# Patient Record
Sex: FEMALE | Race: WHITE | NOT HISPANIC OR LATINO
[De-identification: names, ages, dates, MRNs, and addresses within clinical notes are randomized per-mention and may not be internally consistent; named-entity substitution may affect disease eponyms.]

---

## 2018-02-01 ENCOUNTER — RESULT REVIEW (OUTPATIENT)
Age: 28
End: 2018-02-01

## 2019-01-02 ENCOUNTER — RX RENEWAL (OUTPATIENT)
Age: 29
End: 2019-01-02

## 2019-01-03 ENCOUNTER — RX RENEWAL (OUTPATIENT)
Age: 29
End: 2019-01-03

## 2019-02-25 ENCOUNTER — RX RENEWAL (OUTPATIENT)
Age: 29
End: 2019-02-25

## 2019-03-04 ENCOUNTER — APPOINTMENT (OUTPATIENT)
Dept: OBGYN | Facility: CLINIC | Age: 29
End: 2019-03-04
Payer: MEDICAID

## 2019-03-04 VITALS
DIASTOLIC BLOOD PRESSURE: 70 MMHG | BODY MASS INDEX: 20.2 KG/M2 | WEIGHT: 107 LBS | SYSTOLIC BLOOD PRESSURE: 106 MMHG | HEIGHT: 61 IN

## 2019-03-04 LAB
BILIRUB UR QL STRIP: NORMAL
CLARITY UR: CLEAR
COLLECTION METHOD: NORMAL
GLUCOSE UR-MCNC: NORMAL
HCG UR QL: 0.2 EU/DL
HCG UR QL: NEGATIVE
HGB UR QL STRIP.AUTO: NORMAL
KETONES UR-MCNC: NORMAL
LEUKOCYTE ESTERASE UR QL STRIP: NORMAL
NITRITE UR QL STRIP: NORMAL
PH UR STRIP: 7
PROT UR STRIP-MCNC: NORMAL
QUALITY CONTROL: YES
SP GR UR STRIP: 1.01

## 2019-03-04 PROCEDURE — 81025 URINE PREGNANCY TEST: CPT

## 2019-03-04 PROCEDURE — 81003 URINALYSIS AUTO W/O SCOPE: CPT | Mod: QW

## 2019-03-04 PROCEDURE — 99213 OFFICE O/P EST LOW 20 MIN: CPT

## 2019-03-04 RX ORDER — NORETHINDRONE 0.35 MG/1
0.35 TABLET ORAL DAILY
Qty: 1 | Refills: 1 | Status: DISCONTINUED | COMMUNITY
Start: 2019-01-02 | End: 2019-03-04

## 2019-03-04 NOTE — COUNSELING
[Contraception] : contraception [Safe Sexual Practices] : safe sexual practices [Vulvar Hygiene] : vulvar hygiene

## 2019-03-04 NOTE — PHYSICAL EXAM
[Normal] : uterus [Discharge] : urethral discharge [Heavy] : heavy [Charity] : yellow [Curds] : curd-like [No Bleeding] : there was no active vaginal bleeding [Uterine Adnexae] : were not tender and not enlarged

## 2019-03-04 NOTE — HISTORY OF PRESENT ILLNESS
[Last Mammogram ___] : Last Mammogram was [unfilled] [Last Pap ___] : Last cervical pap smear was [unfilled] [Last Screen ___] : last STD screen was [unfilled] [Sexually Active] : is sexually active [Contraception] : uses contraception [Male ___] : [unfilled] male

## 2019-03-06 LAB
C TRACH RRNA SPEC QL NAA+PROBE: NOT DETECTED
CANDIDA VAG CYTO: DETECTED
G VAGINALIS+PREV SP MTYP VAG QL MICRO: DETECTED
N GONORRHOEA RRNA SPEC QL NAA+PROBE: NOT DETECTED
SOURCE AMPLIFICATION: NORMAL
T VAGINALIS VAG QL WET PREP: NOT DETECTED

## 2019-03-23 ENCOUNTER — TRANSCRIPTION ENCOUNTER (OUTPATIENT)
Age: 29
End: 2019-03-23

## 2019-04-30 ENCOUNTER — RECORD ABSTRACTING (OUTPATIENT)
Age: 29
End: 2019-04-30

## 2019-04-30 DIAGNOSIS — Z80.3 FAMILY HISTORY OF MALIGNANT NEOPLASM OF BREAST: ICD-10-CM

## 2019-04-30 DIAGNOSIS — Z87.440 PERSONAL HISTORY OF URINARY (TRACT) INFECTIONS: ICD-10-CM

## 2019-04-30 DIAGNOSIS — B37.3 CANDIDIASIS OF VULVA AND VAGINA: ICD-10-CM

## 2019-04-30 DIAGNOSIS — B00.9 HERPESVIRAL INFECTION, UNSPECIFIED: ICD-10-CM

## 2019-04-30 DIAGNOSIS — Z78.9 OTHER SPECIFIED HEALTH STATUS: ICD-10-CM

## 2019-04-30 DIAGNOSIS — Z82.62 FAMILY HISTORY OF OSTEOPOROSIS: ICD-10-CM

## 2019-04-30 DIAGNOSIS — Z87.42 PERSONAL HISTORY OF OTHER DISEASES OF THE FEMALE GENITAL TRACT: ICD-10-CM

## 2019-04-30 DIAGNOSIS — A74.9 CHLAMYDIAL INFECTION, UNSPECIFIED: ICD-10-CM

## 2019-04-30 DIAGNOSIS — Z92.89 PERSONAL HISTORY OF OTHER MEDICAL TREATMENT: ICD-10-CM

## 2019-04-30 DIAGNOSIS — Z86.59 PERSONAL HISTORY OF OTHER MENTAL AND BEHAVIORAL DISORDERS: ICD-10-CM

## 2019-04-30 LAB — CYTOLOGY CVX/VAG DOC THIN PREP: NORMAL

## 2019-05-01 ENCOUNTER — APPOINTMENT (OUTPATIENT)
Dept: OBGYN | Facility: CLINIC | Age: 29
End: 2019-05-01

## 2019-05-08 ENCOUNTER — APPOINTMENT (OUTPATIENT)
Dept: OBGYN | Facility: CLINIC | Age: 29
End: 2019-05-08
Payer: COMMERCIAL

## 2019-05-08 VITALS
HEIGHT: 61 IN | SYSTOLIC BLOOD PRESSURE: 110 MMHG | DIASTOLIC BLOOD PRESSURE: 70 MMHG | WEIGHT: 107 LBS | BODY MASS INDEX: 20.2 KG/M2

## 2019-05-08 DIAGNOSIS — Z86.59 PERSONAL HISTORY OF OTHER MENTAL AND BEHAVIORAL DISORDERS: ICD-10-CM

## 2019-05-08 DIAGNOSIS — Z86.69 PERSONAL HISTORY OF OTHER DISEASES OF THE NERVOUS SYSTEM AND SENSE ORGANS: ICD-10-CM

## 2019-05-08 DIAGNOSIS — R87.612 LOW GRADE SQUAMOUS INTRAEPITHELIAL LESION ON CYTOLOGIC SMEAR OF CERVIX (LGSIL): ICD-10-CM

## 2019-05-08 DIAGNOSIS — N76.0 ACUTE VAGINITIS: ICD-10-CM

## 2019-05-08 DIAGNOSIS — B96.89 ACUTE VAGINITIS: ICD-10-CM

## 2019-05-08 DIAGNOSIS — Z30.9 ENCOUNTER FOR CONTRACEPTIVE MANAGEMENT, UNSPECIFIED: ICD-10-CM

## 2019-05-08 DIAGNOSIS — Z86.19 PERSONAL HISTORY OF OTHER INFECTIOUS AND PARASITIC DISEASES: ICD-10-CM

## 2019-05-08 PROCEDURE — 36415 COLL VENOUS BLD VENIPUNCTURE: CPT

## 2019-05-08 PROCEDURE — 81003 URINALYSIS AUTO W/O SCOPE: CPT | Mod: QW

## 2019-05-08 PROCEDURE — 99395 PREV VISIT EST AGE 18-39: CPT

## 2019-05-08 PROCEDURE — 81025 URINE PREGNANCY TEST: CPT

## 2019-05-08 NOTE — PHYSICAL EXAM
[Awake] : awake [Alert] : alert [Soft] : soft [Oriented x3] : oriented to person, place, and time [Normal] : uterus [No Bleeding] : there was no active vaginal bleeding [Uterine Adnexae] : were not tender and not enlarged [Acute Distress] : no acute distress [Mass] : no breast mass [Axillary LAD] : no axillary lymphadenopathy [Nipple Discharge] : no nipple discharge [Tender] : non tender

## 2019-05-08 NOTE — COUNSELING
[Breast Self Exam] : breast self exam [Nutrition] : nutrition [Exercise] : exercise [Vitamins/Supplements] : vitamins/supplements [Sunscreen] : sunscreen [STD (testing, results, tx)] : STD (testing, results, tx) [Contraception] : contraception

## 2019-05-08 NOTE — PROCEDURE
[Cervical Pap Smear] : cervical Pap smear [Liquid Base] : liquid base [No Complications] : there were no complications [Tolerated Well] : the patient tolerated the procedure well [GC & Chlamydia via Pap] : GC & Chlamydia via Pap

## 2019-05-08 NOTE — HISTORY OF PRESENT ILLNESS
[Last Mammogram ___] : Last Mammogram was [unfilled] [Last Pap ___] : Last cervical pap smear was [unfilled] [Last Screen ___] : last STD screen was [unfilled] [Sexually Active] : is sexually active [Contraception] : uses contraception [Definite:  ___ (Date)] : the last menstrual period was [unfilled] [Menarche Age: ____] : age at menarche was [unfilled] [Oral Contraceptives] : uses oral contraceptives [Male ___] : [unfilled] male [Chlamydia] : Chlamydia - [Burning] : no burning [Gonorrhea] : Gonorrhea - [Mass] : no mass [Itching] : no itching [Stinging] : no stinging [Lesion] : no lesion [Soreness] : no soreness [Discharge] : no discharge [Monogamous] : is not monogamous

## 2019-05-18 LAB
BILIRUB UR QL STRIP: NORMAL
C TRACH RRNA SPEC QL NAA+PROBE: NOT DETECTED
CYTOLOGY CVX/VAG DOC THIN PREP: NORMAL
GLUCOSE UR-MCNC: NORMAL
HAV IGM SER QL: NONREACTIVE
HBV CORE IGM SER QL: NONREACTIVE
HBV SURFACE AG SER QL: NONREACTIVE
HCG UR QL: 0.2 EU/DL
HCG UR QL: NEGATIVE
HCV AB SER QL: NONREACTIVE
HCV S/CO RATIO: 0.14 S/CO
HGB UR QL STRIP.AUTO: NORMAL
HIV1+2 AB SPEC QL IA.RAPID: NONREACTIVE
HSV 1+2 IGG SER IA-IMP: NEGATIVE
HSV 1+2 IGG SER IA-IMP: POSITIVE
HSV1 IGG SER QL: 20.1 INDEX
HSV2 IGG SER QL: 0.2 INDEX
KETONES UR-MCNC: NORMAL
LEUKOCYTE ESTERASE UR QL STRIP: NORMAL
N GONORRHOEA RRNA SPEC QL NAA+PROBE: NOT DETECTED
NITRITE UR QL STRIP: NORMAL
PH UR STRIP: 7
PROT UR STRIP-MCNC: NORMAL
QUALITY CONTROL: YES
SOURCE TP AMPLIFICATION: NORMAL
SP GR UR STRIP: 1.01
T PALLIDUM AB SER QL IA: NEGATIVE

## 2019-07-01 ENCOUNTER — APPOINTMENT (OUTPATIENT)
Dept: OBGYN | Facility: CLINIC | Age: 29
End: 2019-07-01
Payer: COMMERCIAL

## 2019-07-01 VITALS
HEIGHT: 61 IN | WEIGHT: 114 LBS | DIASTOLIC BLOOD PRESSURE: 72 MMHG | SYSTOLIC BLOOD PRESSURE: 110 MMHG | BODY MASS INDEX: 21.52 KG/M2

## 2019-07-01 LAB
BILIRUB UR QL STRIP: NORMAL
GLUCOSE UR-MCNC: NORMAL
HCG UR QL: 0.2 EU/DL
HCG UR QL: NEGATIVE
HGB UR QL STRIP.AUTO: NORMAL
KETONES UR-MCNC: NORMAL
LEUKOCYTE ESTERASE UR QL STRIP: NORMAL
NITRITE UR QL STRIP: NORMAL
PH UR STRIP: 5.5
PROT UR STRIP-MCNC: NORMAL
QUALITY CONTROL: YES
SP GR UR STRIP: 1

## 2019-07-01 PROCEDURE — 81025 URINE PREGNANCY TEST: CPT

## 2019-07-01 PROCEDURE — 99213 OFFICE O/P EST LOW 20 MIN: CPT

## 2019-07-01 PROCEDURE — 81003 URINALYSIS AUTO W/O SCOPE: CPT | Mod: QW

## 2019-07-01 RX ORDER — NORETHINDRONE 0.35 MG/1
0.35 TABLET ORAL
Qty: 1 | Refills: 0 | Status: DISCONTINUED | COMMUNITY
Start: 2019-02-25 | End: 2019-07-01

## 2019-07-01 RX ORDER — METRONIDAZOLE 7.5 MG/G
0.75 GEL VAGINAL
Qty: 1 | Refills: 0 | Status: DISCONTINUED | COMMUNITY
Start: 2019-03-04 | End: 2019-07-01

## 2019-07-01 RX ORDER — FLUCONAZOLE 150 MG/1
150 TABLET ORAL
Qty: 4 | Refills: 1 | Status: DISCONTINUED | COMMUNITY
Start: 2019-03-04 | End: 2019-07-01

## 2019-07-01 RX ORDER — NORETHINDRONE 0.35 MG/1
0.35 TABLET ORAL
Refills: 0 | Status: DISCONTINUED | COMMUNITY
End: 2019-07-01

## 2019-07-02 NOTE — PHYSICAL EXAM
[Awake] : awake [Alert] : alert [Acute Distress] : no acute distress [Soft] : soft [Tender] : non tender [Distended] : not distended [Oriented x3] : oriented to person, place, and time [Depressed Mood] : not depressed [No Lesions] : no genitalia lesions [Labia Majora Erythema] : no erythema of the labia majora [Labia Minora Erythema] : no erythema of the labia minora [Normal] : uterus [Labia Majora] : labia major [Labia Minora] : labia minora [Pink Rugae] : pink rugae [Discharge] : a  ~M vaginal discharge was present [Moderate] : moderate [Foul Smelling] : not foul smelling [White] : white [Thin] : thin [No Bleeding] : there was no active vaginal bleeding [Dilated] : the cervix was not dilated [Motion Tenderness] : there was no cervical motion tenderness [Normal Position] : in a normal position [Tenderness] : nontender [Enlarged ___ wks] : not enlarged [Mass ___ cm] : no uterine mass was palpated [Uterine Adnexae] : were not tender and not enlarged [Adnexa Tenderness] : were not tender [Ovarian Mass (___ Cm)] : there were no adnexal masses

## 2019-07-02 NOTE — HISTORY OF PRESENT ILLNESS
[___ Month(s) Ago] : [unfilled] month(s) ago [Good] : being in good health [Healthy Diet] : a healthy diet [Regular Exercise] : regular exercise [Last Mammogram ___] : Last Mammogram was [unfilled] [Last Pap ___] : Last cervical pap smear was [unfilled] [Reproductive Age] : is of reproductive age [Oral Contraceptive] : uses oral contraception pills [Pregnancy History] : pregnancy history: [Total Preg ___] : [unfilled] [Full Term ___] : [unfilled] [Living ___] : [unfilled] [Definite:  ___ (Date)] : the last menstrual period was [unfilled] [Menarche Age: ____] : age at menarche was [unfilled] [Sexually Active] : is sexually active [Monogamous] : is monogamous [Contraception] : uses contraception [Oral Contraceptives] : uses oral contraceptives [Male ___] : [unfilled] male [No] : no [Weight Concerns] : no concerns with her weight [de-identified] : PELVIC US 3/11/2013

## 2019-07-09 ENCOUNTER — TRANSCRIPTION ENCOUNTER (OUTPATIENT)
Age: 29
End: 2019-07-09

## 2019-09-04 LAB
APPEARANCE: CLEAR
BACTERIA UR CULT: NORMAL
BACTERIA: NEGATIVE
BILIRUBIN URINE: NEGATIVE
BLOOD URINE: NEGATIVE
C TRACH RRNA SPEC QL NAA+PROBE: NOT DETECTED
CANDIDA VAG CYTO: NOT DETECTED
COLOR: COLORLESS
G VAGINALIS+PREV SP MTYP VAG QL MICRO: DETECTED
GLUCOSE QUALITATIVE U: NEGATIVE
HYALINE CASTS: 2 /LPF
KETONES URINE: NEGATIVE
LEUKOCYTE ESTERASE URINE: NEGATIVE
MICROSCOPIC-UA: NORMAL
N GONORRHOEA RRNA SPEC QL NAA+PROBE: NOT DETECTED
NITRITE URINE: NEGATIVE
PH URINE: 6.5
PROTEIN URINE: NEGATIVE
RED BLOOD CELLS URINE: 1 /HPF
SOURCE AMPLIFICATION: NORMAL
SPECIFIC GRAVITY URINE: 1.01
SQUAMOUS EPITHELIAL CELLS: 3 /HPF
T VAGINALIS VAG QL WET PREP: NOT DETECTED
UROBILINOGEN URINE: NORMAL
WHITE BLOOD CELLS URINE: 1 /HPF

## 2019-10-17 ENCOUNTER — APPOINTMENT (OUTPATIENT)
Dept: OBGYN | Facility: CLINIC | Age: 29
End: 2019-10-17

## 2020-01-28 ENCOUNTER — APPOINTMENT (OUTPATIENT)
Dept: FAMILY MEDICINE | Facility: CLINIC | Age: 30
End: 2020-01-28
Payer: COMMERCIAL

## 2020-01-28 VITALS
SYSTOLIC BLOOD PRESSURE: 80 MMHG | HEART RATE: 75 BPM | TEMPERATURE: 98.1 F | DIASTOLIC BLOOD PRESSURE: 58 MMHG | BODY MASS INDEX: 20.01 KG/M2 | OXYGEN SATURATION: 98 % | RESPIRATION RATE: 16 BRPM | HEIGHT: 61 IN | WEIGHT: 106 LBS

## 2020-01-28 DIAGNOSIS — G43.909 MIGRAINE, UNSPECIFIED, NOT INTRACTABLE, W/OUT STATUS MIGRAINOSUS: ICD-10-CM

## 2020-01-28 DIAGNOSIS — Z13.220 ENCOUNTER FOR SCREENING FOR LIPOID DISORDERS: ICD-10-CM

## 2020-01-28 DIAGNOSIS — Z13.39 ENCOUNTER FOR SCREENING EXAM FOR OTHER MENTAL HEALTH AND BEHAVIORAL DISORDERS: ICD-10-CM

## 2020-01-28 DIAGNOSIS — Z00.00 ENCOUNTER FOR GENERAL ADULT MEDICAL EXAMINATION W/OUT ABNORMAL FINDINGS: ICD-10-CM

## 2020-01-28 DIAGNOSIS — Z13.31 ENCOUNTER FOR SCREENING FOR DEPRESSION: ICD-10-CM

## 2020-01-28 DIAGNOSIS — Z86.2 PERSONAL HISTORY OF DISEASES OF THE BLOOD AND BLOOD-FORMING ORGANS AND CERTAIN DISORDERS INVOLVING THE IMMUNE MECHANISM: ICD-10-CM

## 2020-01-28 DIAGNOSIS — Z13.29 ENCOUNTER FOR SCREENING FOR OTHER SUSPECTED ENDOCRINE DISORDER: ICD-10-CM

## 2020-01-28 PROCEDURE — 99385 PREV VISIT NEW AGE 18-39: CPT | Mod: 25

## 2020-01-28 PROCEDURE — G0442 ANNUAL ALCOHOL SCREEN 15 MIN: CPT

## 2020-01-28 PROCEDURE — 36415 COLL VENOUS BLD VENIPUNCTURE: CPT

## 2020-01-28 RX ORDER — SUMATRIPTAN 100 MG/1
100 TABLET, FILM COATED ORAL
Refills: 0 | Status: DISCONTINUED | COMMUNITY
End: 2020-01-28

## 2020-01-28 RX ORDER — METRONIDAZOLE 7.5 MG/G
0.75 GEL VAGINAL
Qty: 1 | Refills: 0 | Status: DISCONTINUED | COMMUNITY
Start: 2019-07-01 | End: 2020-01-28

## 2020-01-28 RX ORDER — FLUCONAZOLE 150 MG/1
150 TABLET ORAL
Qty: 1 | Refills: 0 | Status: DISCONTINUED | COMMUNITY
Start: 2019-07-15 | End: 2020-01-28

## 2020-01-28 RX ORDER — NORETHINDRONE 0.35 MG/1
0.35 TABLET ORAL DAILY
Qty: 84 | Refills: 3 | Status: DISCONTINUED | COMMUNITY
Start: 2019-05-08 | End: 2020-01-28

## 2020-01-28 RX ORDER — GABAPENTIN 400 MG/1
400 CAPSULE ORAL
Refills: 0 | Status: DISCONTINUED | COMMUNITY
End: 2020-01-28

## 2020-01-28 NOTE — PHYSICAL EXAM
[No Acute Distress] : no acute distress [Well Nourished] : well nourished [Well Developed] : well developed [Normal Sclera/Conjunctiva] : normal sclera/conjunctiva [Well-Appearing] : well-appearing [PERRL] : pupils equal round and reactive to light [EOMI] : extraocular movements intact [Normal Outer Ear/Nose] : the outer ears and nose were normal in appearance [Normal Oropharynx] : the oropharynx was normal [No JVD] : no jugular venous distention [No Lymphadenopathy] : no lymphadenopathy [Supple] : supple [No Respiratory Distress] : no respiratory distress  [Thyroid Normal, No Nodules] : the thyroid was normal and there were no nodules present [No Accessory Muscle Use] : no accessory muscle use [Clear to Auscultation] : lungs were clear to auscultation bilaterally [Regular Rhythm] : with a regular rhythm [Normal Rate] : normal rate  [Normal S1, S2] : normal S1 and S2 [No Murmur] : no murmur heard [No Edema] : there was no peripheral edema [Pedal Pulses Present] : the pedal pulses are present [No Extremity Clubbing/Cyanosis] : no extremity clubbing/cyanosis [Soft] : abdomen soft [Non Tender] : non-tender [No Masses] : no abdominal mass palpated [Non-distended] : non-distended [No HSM] : no HSM [Normal Bowel Sounds] : normal bowel sounds [Normal Anterior Cervical Nodes] : no anterior cervical lymphadenopathy [Normal Posterior Cervical Nodes] : no posterior cervical lymphadenopathy [No CVA Tenderness] : no CVA  tenderness [No Spinal Tenderness] : no spinal tenderness [No Joint Swelling] : no joint swelling [Grossly Normal Strength/Tone] : grossly normal strength/tone [Coordination Grossly Intact] : coordination grossly intact [No Rash] : no rash [No Focal Deficits] : no focal deficits [Normal Gait] : normal gait [Deep Tendon Reflexes (DTR)] : deep tendon reflexes were 2+ and symmetric [Normal Affect] : the affect was normal [Normal Insight/Judgement] : insight and judgment were intact

## 2020-01-28 NOTE — ASSESSMENT
[FreeTextEntry1] : Annual\par Check labs\par Follow up with GYN regarding irregular cycles and OCP`s.\par Refuses flu vaccine.

## 2020-01-28 NOTE — HEALTH RISK ASSESSMENT
[Excellent] : ~his/her~  mood as  excellent [Yes] : Yes [2 - 4 times a month (2 pts)] : 2-4 times a month (2 points) [1 or 2 (0 pts)] : 1 or 2 (0 points) [Never (0 pts)] : Never (0 points) [No falls in past year] : Patient reported no falls in the past year [0] : 2) Feeling down, depressed, or hopeless: Not at all (0) [Patient reported PAP Smear was normal] : Patient reported PAP Smear was normal [None] : None [With Family] : lives with family [Employed] : employed [Student] : student [Sexually Active] : sexually active [Single] : single [Smoke Detector] : smoke detector [Carbon Monoxide Detector] : carbon monoxide detector [Seat Belt] :  uses seat belt [Sunscreen] : uses sunscreen [Patient/Caregiver unclear of wishes] : Patient/Caregiver unclear of wishes [] : No [[id number]] : 0 [Audit-CScore] : 2 [Change in mental status noted] : No change in mental status noted [High Risk Behavior] : no high risk behavior [Reports changes in hearing] : Reports no changes in hearing [Reports changes in vision] : Reports no changes in vision [Reports changes in dental health] : Reports no changes in dental health [TB Exposure] : is not being exposed to tuberculosis [PapSmearDate] : 5/2019 [HIVDate] : 5/2019 [HepatitisCDate] : 5/2019 [FreeTextEntry2] : 2 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 [AdvancecareDate] : 1/2020

## 2020-01-28 NOTE — HISTORY OF PRESENT ILLNESS
[FreeTextEntry1] : annual/ Establish care [de-identified] : Ms. ONEYDA CORDOVA is a 29 year old female presenting for an annual, establish care\par since she changed her birth control pills, migraines have reduced but she has noted that her cycles are irregular.\par

## 2020-02-03 ENCOUNTER — TRANSCRIPTION ENCOUNTER (OUTPATIENT)
Age: 30
End: 2020-02-03

## 2020-02-03 LAB
25(OH)D3 SERPL-MCNC: 37.8 NG/ML
ALBUMIN SERPL ELPH-MCNC: 4.7 G/DL
ALP BLD-CCNC: 39 U/L
ALT SERPL-CCNC: 14 U/L
ANION GAP SERPL CALC-SCNC: 11 MMOL/L
APPEARANCE: CLEAR
AST SERPL-CCNC: 14 U/L
BASOPHILS # BLD AUTO: 0.03 K/UL
BASOPHILS NFR BLD AUTO: 0.3 %
BILIRUB SERPL-MCNC: 0.5 MG/DL
BILIRUBIN URINE: NEGATIVE
BLOOD URINE: NEGATIVE
BUN SERPL-MCNC: 12 MG/DL
CALCIUM SERPL-MCNC: 9.2 MG/DL
CHLORIDE SERPL-SCNC: 104 MMOL/L
CHOLEST SERPL-MCNC: 161 MG/DL
CHOLEST/HDLC SERPL: 2 RATIO
CO2 SERPL-SCNC: 24 MMOL/L
COLOR: COLORLESS
CREAT SERPL-MCNC: 0.72 MG/DL
EOSINOPHIL # BLD AUTO: 0.03 K/UL
EOSINOPHIL NFR BLD AUTO: 0.3 %
FERRITIN SERPL-MCNC: 54 NG/ML
GLUCOSE QUALITATIVE U: NEGATIVE
GLUCOSE SERPL-MCNC: 80 MG/DL
HCT VFR BLD CALC: 43 %
HDLC SERPL-MCNC: 81 MG/DL
HGB BLD-MCNC: 13.7 G/DL
IMM GRANULOCYTES NFR BLD AUTO: 0.3 %
IRON SATN MFR SERPL: 39 %
IRON SERPL-MCNC: 104 UG/DL
KETONES URINE: NEGATIVE
LDLC SERPL CALC-MCNC: 73 MG/DL
LEUKOCYTE ESTERASE URINE: NEGATIVE
LYMPHOCYTES # BLD AUTO: 2.22 K/UL
LYMPHOCYTES NFR BLD AUTO: 21.6 %
MAN DIFF?: NORMAL
MCHC RBC-ENTMCNC: 30.5 PG
MCHC RBC-ENTMCNC: 31.9 GM/DL
MCV RBC AUTO: 95.8 FL
MONOCYTES # BLD AUTO: 0.47 K/UL
MONOCYTES NFR BLD AUTO: 4.6 %
NEUTROPHILS # BLD AUTO: 7.49 K/UL
NEUTROPHILS NFR BLD AUTO: 72.9 %
NITRITE URINE: NEGATIVE
PH URINE: 6.5
PLATELET # BLD AUTO: 199 K/UL
POTASSIUM SERPL-SCNC: 4.4 MMOL/L
PROT SERPL-MCNC: 6.9 G/DL
PROTEIN URINE: NEGATIVE
RBC # BLD: 4.49 M/UL
RBC # FLD: 12.6 %
SODIUM SERPL-SCNC: 139 MMOL/L
SPECIFIC GRAVITY URINE: 1
T4 FREE SERPL-MCNC: 1.2 NG/DL
TIBC SERPL-MCNC: 268 UG/DL
TRIGL SERPL-MCNC: 34 MG/DL
TSH SERPL-ACNC: 1.21 UIU/ML
UIBC SERPL-MCNC: 164 UG/DL
URATE SERPL-MCNC: 3.5 MG/DL
UROBILINOGEN URINE: NORMAL
VIT B12 SERPL-MCNC: 983 PG/ML
WBC # FLD AUTO: 10.27 K/UL

## 2020-03-12 ENCOUNTER — APPOINTMENT (OUTPATIENT)
Dept: OBGYN | Facility: CLINIC | Age: 30
End: 2020-03-12
Payer: COMMERCIAL

## 2020-03-12 VITALS
HEIGHT: 61 IN | BODY MASS INDEX: 20.77 KG/M2 | SYSTOLIC BLOOD PRESSURE: 100 MMHG | WEIGHT: 110 LBS | DIASTOLIC BLOOD PRESSURE: 60 MMHG

## 2020-03-12 PROCEDURE — 99214 OFFICE O/P EST MOD 30 MIN: CPT

## 2020-03-12 NOTE — DISCUSSION/SUMMARY
[FreeTextEntry1] : 29 year old  1 para 1 is here for contraceptive surveillance and discussion about recurrent bacterial vaginosis.\par \par ONEYDA is on a progesterone -only pill secondary to migraines.  She is satisfied but does admit that her bleeding is erratic with her sometimes "skipping a whole month."  She admits to good compliance and we talked about the mechanism of her pill as well as instructions, precautions, expectations.  She requests a renewal and a prescription was electronically transmitted to her pharmacy.\par \par The patient is frustrated by the frequency of her infections and the foul odor she notes after intercourse.  She has been prescribed MetroGel in the past and has self-treated with "some extra left in the tube."  ONEYDA does not want to have to take medicine "all the time...Its ruining my sex life."  We spoke about the issues including the importance of vulvovaginal hygiene for maintenance of pH and the ways via diet, supplements to accomplish this.  She will try the 600 mg Sero Lisa boric acid suppositories and will return for follow up at the time of her upcoming annual exam.\par \par All of her concerns were addressed, questions answered and reassurance was given.

## 2020-03-12 NOTE — COUNSELING
[Nutrition] : nutrition [Vitamins/Supplements] : vitamins/supplements [Contraception] : contraception [Sunscreen] : sunscreen [Vulvar Hygiene] : vulvar hygiene

## 2020-03-12 NOTE — HISTORY OF PRESENT ILLNESS
[Last Mammogram ___] : Last Mammogram was [unfilled] [Last Pap ___] : Last cervical pap smear was [unfilled] [Last Screen ___] : last STD screen was [unfilled] [Definite:  ___ (Date)] : the last menstrual period was [unfilled] [Menarche Age: ____] : age at menarche was [unfilled] [Sexually Active] : is sexually active [Contraception] : uses contraception [Oral Contraceptives] : uses oral contraceptives [Male ___] : [unfilled] male [Chlamydia] : Chlamydia - [Gonorrhea] : Gonorrhea - [Burning] : no burning [Itching] : no itching [Mass] : no mass [Stinging] : no stinging [Lesion] : no lesion [Soreness] : no soreness [Discharge] : no discharge [Monogamous] : is not monogamous

## 2020-05-25 ENCOUNTER — TRANSCRIPTION ENCOUNTER (OUTPATIENT)
Age: 30
End: 2020-05-25

## 2020-06-05 ENCOUNTER — APPOINTMENT (OUTPATIENT)
Dept: OBGYN | Facility: CLINIC | Age: 30
End: 2020-06-05
Payer: MEDICAID

## 2020-06-05 VITALS
BODY MASS INDEX: 21.71 KG/M2 | HEIGHT: 61 IN | SYSTOLIC BLOOD PRESSURE: 98 MMHG | DIASTOLIC BLOOD PRESSURE: 60 MMHG | TEMPERATURE: 98.3 F | WEIGHT: 115 LBS

## 2020-06-05 DIAGNOSIS — Z01.419 ENCOUNTER FOR GYNECOLOGICAL EXAMINATION (GENERAL) (ROUTINE) W/OUT ABNORMAL FINDINGS: ICD-10-CM

## 2020-06-05 DIAGNOSIS — Z87.42 PERSONAL HISTORY OF OTHER DISEASES OF THE FEMALE GENITAL TRACT: ICD-10-CM

## 2020-06-05 PROCEDURE — 99395 PREV VISIT EST AGE 18-39: CPT

## 2020-06-05 NOTE — HISTORY OF PRESENT ILLNESS
[Last Screen ___] : last STD screen was [unfilled] [Definite:  ___ (Date)] : the last menstrual period was [unfilled] [Menarche Age: ____] : age at menarche was [unfilled] [Sexually Active] : is sexually active [Contraception] : uses contraception [Oral Contraceptives] : uses oral contraceptives [Male ___] : [unfilled] male [1 Year Ago] : 1 year ago [Good] : being in good health [Last Pap Smear ___] : last Papanicolaou cytology done [unfilled] [Last Mammogram ___] : last mammogram done [unfilled] [No Previous Colonoscopy] : no previous colonoscopy [Regular Exercise] : regular exercise [Healthy Diet] : a healthy diet [Night Sweats] : night sweats [Chlamydia] : Chlamydia - [Gonorrhea] : Gonorrhea - [Burning] : no burning [Itching] : no itching [Mass] : no mass [Lesion] : no lesion [Stinging] : no stinging [Soreness] : no soreness [Localized Pain] : no localized pain [Discharge] : no discharge [Mass (___cm)] : no palpable mass [Diffused Pain] : no diffused pain [Skin Color Change] : no skin color change [Nipple Discharge] : no nipple discharge [Hot Flashes] : no hot flashes [Vaginal Itching] : no vaginal itching [Dyspareunia] : no dyspareunia [Mood Changes] : no mood changes [Monogamous] : is not monogamous

## 2020-06-05 NOTE — DISCUSSION/SUMMARY
[Progesterone (only) Pill] : progesterone (only) pill [Cycle Control] : cycle control [Pregnancy Prevention] : pregnancy prevention [FreeTextEntry1] : \par 30 year old  1 para 1, whose last menstrual period was 5/15/2020, is here for an annual exam and contraceptive surveillance.  Her Pap smear from 2019 was negative. \par \par ONEYDA was seen on 3/12/2020 for contraceptive surveillance and discussion about recurrent bacterial vaginosis.\par \par She is on a progesterone-only pill secondary to migraines and is satisfied to-date.  The instructions, precautions, expectations were reviewed and a prescription was electronically transmitted to her pharmacy.  She requests a renewal and a prescription was electronically transmitted to her pharmacy.  Secondary to recent issues with e-scribing, the prescription was also called into her pharmacy.\par \par The patient is frustrated by the frequency of her infections and the foul odor she notes after intercourse.  She has not been sexually active in light of the pandemic so has no complaints.  She has been prescribed MetroGel in the past and has self-treated with "some extra left in the tube."  ONEYDA is worried about the effects of this on her sex life.  On speculum examination, there is scant, foul-smelling, thick discharge in the vault.  A culture was taken and she accepted empiric treatment with MetroGel.  We spoke about the issues including the importance of vulvovaginal hygiene for maintenance of pH and the ways via diet, supplements to accomplish this.  She purchased 600 mg SeroFlora boric acid suppositories on [employer] and has used them occasionally.\par \par On inspection of the breasts, there were no skin color changes, dimpling or retraction seen.  No masses or axillary lymphadenopathy were appreciated on palpation.  She was advised of the current recommendations to reduce breast cancer including but not limited to engaging in regular exercise, consuming a low fat diet, limiting alcohol intake, maintaining ideal body weight and performing monthly self breast examination. \par \par All of her concerns were addressed, questions answered and reassurance was given.

## 2020-06-05 NOTE — COUNSELING
[Nutrition] : nutrition [Vitamins/Supplements] : vitamins/supplements [Contraception] : contraception [Sunscreen] : sunscreen [Vulvar Hygiene] : vulvar hygiene [Breast Self Exam] : breast self exam [Exercise] : exercise

## 2020-06-06 LAB
CANDIDA VAG CYTO: NOT DETECTED
G VAGINALIS+PREV SP MTYP VAG QL MICRO: DETECTED
HPV HIGH+LOW RISK DNA PNL CVX: NOT DETECTED
T VAGINALIS VAG QL WET PREP: NOT DETECTED

## 2020-07-16 ENCOUNTER — TRANSCRIPTION ENCOUNTER (OUTPATIENT)
Age: 30
End: 2020-07-16

## 2020-09-04 ENCOUNTER — RX RENEWAL (OUTPATIENT)
Age: 30
End: 2020-09-04

## 2020-12-23 PROBLEM — Z01.419 ENCOUNTER FOR ANNUAL ROUTINE GYNECOLOGICAL EXAMINATION: Status: RESOLVED | Noted: 2020-03-12 | Resolved: 2020-12-23

## 2021-02-17 ENCOUNTER — APPOINTMENT (OUTPATIENT)
Dept: OBGYN | Facility: CLINIC | Age: 31
End: 2021-02-17
Payer: MEDICAID

## 2021-02-17 VITALS
WEIGHT: 113 LBS | TEMPERATURE: 97.3 F | HEIGHT: 61 IN | BODY MASS INDEX: 21.34 KG/M2 | SYSTOLIC BLOOD PRESSURE: 100 MMHG | DIASTOLIC BLOOD PRESSURE: 62 MMHG

## 2021-02-17 PROCEDURE — XXXXX: CPT

## 2021-02-17 NOTE — END OF VISIT
[FreeTextEntry3] : I, Evette Whitehead, solely acted as scribe for Dr. Esperanza Knight on 02/17/2021.\par All medical entries made by the Scribe were at my, Dr. Knight's, direction and personally dictated by me on 02/17/2021. I have reviewed the chart and agree that the record accurately reflects my personal performance of the history, physical exam, assessment and plan. I have also personally directed, reviewed, and agreed with the chart.

## 2021-02-17 NOTE — HISTORY OF PRESENT ILLNESS
[Patient reported PAP Smear was normal] : Patient reported PAP Smear was normal [N] : Patient reports normal menses [Oral Contraceptive] : uses oral contraception pills [Y] : Positive pregnancy history [Menarche Age: ____] : age at menarche was [unfilled] [No] : Patient does not have concerns regarding sex [Currently Active] : currently active [Men] : men [Yes] : Yes [TextBox_4] : Pt presents with complaints of pain with intercourse [PapSmeardate] : 06/05/2020 [HPVDate] : 06/05/2020 [LMPDate] : 02/01/2021 [PGxTotal] : 1 [[facility]] : [facility] [[facility]] : 1 [FreeTextEntry1] : 02/01/2021 [FreeTextEntry3] : OCP

## 2021-02-17 NOTE — PHYSICAL EXAM
[Appropriately responsive] : appropriately responsive [Alert] : alert [No Acute Distress] : no acute distress [Soft] : soft [Non-tender] : non-tender [Non-distended] : non-distended [No HSM] : No HSM [No Lesions] : no lesions [No Mass] : no mass [Oriented x3] : oriented x3 [Labia Majora] : normal [Labia Minora] : normal [Discharge] : a  ~M vaginal discharge was present [White] : white [Thin] : thin [No Bleeding] : There was no active vaginal bleeding [Normal] : normal [Uterine Adnexae] : normal

## 2021-02-17 NOTE — DISCUSSION/SUMMARY
[FreeTextEntry1] : -Dyspareunia - Thin white vaginal discharge noted. No masses palpated. Affirm culture collected. Rx for pelvic sonogram ordered. Urine sample was collected to r/o UTI.\par \par -Follow up in 1 week for pelvic sonogram and visit. All questions and concerns were addressed.

## 2021-02-24 ENCOUNTER — ASOB RESULT (OUTPATIENT)
Age: 31
End: 2021-02-24

## 2021-02-24 ENCOUNTER — APPOINTMENT (OUTPATIENT)
Dept: OBGYN | Facility: CLINIC | Age: 31
End: 2021-02-24
Payer: MEDICAID

## 2021-02-24 VITALS
HEIGHT: 61 IN | WEIGHT: 113 LBS | DIASTOLIC BLOOD PRESSURE: 62 MMHG | SYSTOLIC BLOOD PRESSURE: 98 MMHG | TEMPERATURE: 98.1 F | BODY MASS INDEX: 21.34 KG/M2

## 2021-02-24 PROCEDURE — 99213 OFFICE O/P EST LOW 20 MIN: CPT | Mod: 25

## 2021-02-24 PROCEDURE — 76830 TRANSVAGINAL US NON-OB: CPT

## 2021-02-24 PROCEDURE — 99072 ADDL SUPL MATRL&STAF TM PHE: CPT

## 2021-02-24 NOTE — DISCUSSION/SUMMARY
[FreeTextEntry1] : \par Discussed her previous lab results that were drawn on 2/17/21 are all WNL including negative pregnancy test.\par \par Discussed TV sono: anteverted uterus, 2.3mm endometrium, mutiple nabothian cysts seen in cervix, right ovary has a complex cyst 2.3cm, left ovary appears WNL, minimal free fluid seen in cul de sac.\par \par Differentials for pelvic pain reviewed including possible cyst rupture and r/o ovarian torsion . Call parameters for severe pain reviewed to call office for evaluation or go to ED if office is closed if pain occurs and persists again. She is to return in 2 months for repeat pelvic ultrasound with followup results visit afterwards. Patient verbalizes understanding.\par \par \par

## 2021-02-24 NOTE — HISTORY OF PRESENT ILLNESS
[Patient reported PAP Smear was normal] : Patient reported PAP Smear was normal [Oral Contraceptive] : uses oral contraception pills [Y] : Positive pregnancy history [Menarche Age: ____] : age at menarche was [unfilled] [PapSmeardate] : 6/5/2020 [HPVDate] : 6/2/2020 [TextBox_78] : NEG [LMPDate] : 2/1/2020 [PGxTotal] : 1 [[facility]xFul[facility]] : 1 [[facility]] : 1 [FreeTextEntry1] : 2/1/2020

## 2021-03-03 LAB
APPEARANCE: CLEAR
BACTERIA UR CULT: NORMAL
BACTERIA: NEGATIVE
BILIRUBIN URINE: NEGATIVE
BLOOD URINE: NEGATIVE
C TRACH RRNA SPEC QL NAA+PROBE: NOT DETECTED
CANDIDA VAG CYTO: NOT DETECTED
COLOR: NORMAL
G VAGINALIS+PREV SP MTYP VAG QL MICRO: NOT DETECTED
GLUCOSE QUALITATIVE U: NEGATIVE
HCG UR QL: NEGATIVE
HYALINE CASTS: 0 /LPF
KETONES URINE: NEGATIVE
LEUKOCYTE ESTERASE URINE: NEGATIVE
MICROSCOPIC-UA: NORMAL
N GONORRHOEA RRNA SPEC QL NAA+PROBE: NOT DETECTED
NITRITE URINE: NEGATIVE
PH URINE: 6
PROTEIN URINE: NEGATIVE
QUALITY CONTROL: YES
RED BLOOD CELLS URINE: 0 /HPF
SOURCE AMPLIFICATION: NORMAL
SPECIFIC GRAVITY URINE: 1.02
SQUAMOUS EPITHELIAL CELLS: 1 /HPF
T VAGINALIS VAG QL WET PREP: NOT DETECTED
UROBILINOGEN URINE: NORMAL
WHITE BLOOD CELLS URINE: 0 /HPF

## 2021-04-14 ENCOUNTER — APPOINTMENT (OUTPATIENT)
Dept: OBGYN | Facility: CLINIC | Age: 31
End: 2021-04-14

## 2021-05-12 ENCOUNTER — ASOB RESULT (OUTPATIENT)
Age: 31
End: 2021-05-12

## 2021-05-12 ENCOUNTER — APPOINTMENT (OUTPATIENT)
Dept: OBGYN | Facility: CLINIC | Age: 31
End: 2021-05-12
Payer: MEDICAID

## 2021-05-12 VITALS
BODY MASS INDEX: 21.71 KG/M2 | HEIGHT: 61 IN | DIASTOLIC BLOOD PRESSURE: 70 MMHG | SYSTOLIC BLOOD PRESSURE: 110 MMHG | TEMPERATURE: 97.2 F | WEIGHT: 115 LBS

## 2021-05-12 DIAGNOSIS — Z30.41 ENCOUNTER FOR SURVEILLANCE OF CONTRACEPTIVE PILLS: ICD-10-CM

## 2021-05-12 DIAGNOSIS — R10.2 PELVIC AND PERINEAL PAIN: ICD-10-CM

## 2021-05-12 DIAGNOSIS — Z87.898 PERSONAL HISTORY OF OTHER SPECIFIED CONDITIONS: ICD-10-CM

## 2021-05-12 DIAGNOSIS — Z12.39 ENCOUNTER FOR OTHER SCREENING FOR MALIGNANT NEOPLASM OF BREAST: ICD-10-CM

## 2021-05-12 DIAGNOSIS — Z30.09 ENCOUNTER FOR OTHER GENERAL COUNSELING AND ADVICE ON CONTRACEPTION: ICD-10-CM

## 2021-05-12 DIAGNOSIS — Z11.3 ENCOUNTER FOR SCREENING FOR INFECTIONS WITH A PREDOMINANTLY SEXUAL MODE OF TRANSMISSION: ICD-10-CM

## 2021-05-12 DIAGNOSIS — Z87.42 PERSONAL HISTORY OF OTHER DISEASES OF THE FEMALE GENITAL TRACT: ICD-10-CM

## 2021-05-12 DIAGNOSIS — N76.0 ACUTE VAGINITIS: ICD-10-CM

## 2021-05-12 PROCEDURE — 99212 OFFICE O/P EST SF 10 MIN: CPT | Mod: 25

## 2021-05-12 PROCEDURE — 99072 ADDL SUPL MATRL&STAF TM PHE: CPT

## 2021-05-12 PROCEDURE — 76830 TRANSVAGINAL US NON-OB: CPT

## 2021-05-12 RX ORDER — METRONIDAZOLE 7.5 MG/G
0.75 GEL VAGINAL
Qty: 1 | Refills: 0 | Status: DISCONTINUED | COMMUNITY
Start: 2020-06-05 | End: 2021-05-12

## 2021-05-12 NOTE — HISTORY OF PRESENT ILLNESS
[Patient reported PAP Smear was normal] : Patient reported PAP Smear was normal [Gonorrhea test offered] : Gonorrhea test offered [Chlamydia test offered] : Chlamydia test offered [HPV test offered] : HPV test offered [N] : Patient reports normal menses [Oral Contraceptive] : uses oral contraception pills [Monogamous (Male Partner)] : is monogamous with a male partner [Y] : Positive pregnancy history [Menarche Age: ____] : age at menarche was [unfilled] [Yes] : Patient has concerns regarding sex [Currently Active] : currently active [Men] : men [Vaginal] : vaginal [No] : No [Patient refuses STI testing] : Patient refuses STI testing [TextBox_4] : PATIENT PRESENTS FOR SONO AND RESULTS. [Papeardate] : 06/05/20 [TextBox_31] : NEG [GonorrheaDate] : 02/17/21 [TextBox_63] : NEG [ChlamydiaDate] : 02/17/21 [TextBox_68] : NEG [HPVDate] : 06/05/20 [LMPDate] : 05/10/21 [PGxTotal] : 1 [[facility]] : 1 [[facility]] : 1 [FreeTextEntry1] : PAIN

## 2021-05-12 NOTE — DISCUSSION/SUMMARY
[FreeTextEntry1] : Differential dx reviewed.  PT stating she will schedule a consult with GI. All the pt's questions and concerns were addressed.

## 2021-05-12 NOTE — COUNSELING
[Bladder Hygiene] : bladder hygiene [Contraception/ Emergency Contraception/ Safe Sexual Practices] : contraception, emergency contraception, safe sexual practices

## 2021-05-12 NOTE — REVIEW OF SYSTEMS
[Patient Intake Form Reviewed] : Patient intake form was reviewed [Abdominal Pain] : abdominal pain [Negative] : Heme/Lymph

## 2021-05-12 NOTE — PHYSICAL EXAM
[Appropriately responsive] : appropriately responsive [Alert] : alert [No Acute Distress] : no acute distress [Oriented x3] : oriented x3 [Labia Majora] : normal [Labia Minora] : normal [No Bleeding] : There was no active vaginal bleeding [Normal] : normal [Normal Position] : in a normal position [Uterine Adnexae] : normal

## 2021-05-17 ENCOUNTER — NON-APPOINTMENT (OUTPATIENT)
Age: 31
End: 2021-05-17

## 2021-06-09 ENCOUNTER — NON-APPOINTMENT (OUTPATIENT)
Age: 31
End: 2021-06-09

## 2021-07-08 ENCOUNTER — APPOINTMENT (OUTPATIENT)
Dept: OBGYN | Facility: CLINIC | Age: 31
End: 2021-07-08

## 2021-07-08 NOTE — HISTORY OF PRESENT ILLNESS
[Patient reported PAP Smear was normal] : Patient reported PAP Smear was normal [Y] : Positive pregnancy history [PapSmeardate] : 06/05/2020 [TextBox_31] : NEG [PGxTotal] : 1 [[facility]xFul[facility]] : 1 [[facility]iving] : 1 [Yes] : pregnancy [TextBox_9] : 13 [Currently Active] : currently active [Men] : men

## 2021-07-30 ENCOUNTER — APPOINTMENT (OUTPATIENT)
Dept: OBGYN | Facility: CLINIC | Age: 31
End: 2021-07-30

## 2021-08-12 ENCOUNTER — APPOINTMENT (OUTPATIENT)
Dept: OBGYN | Facility: CLINIC | Age: 31
End: 2021-08-12
Payer: MEDICAID

## 2021-08-12 ENCOUNTER — RESULT CHARGE (OUTPATIENT)
Age: 31
End: 2021-08-12

## 2021-08-12 VITALS
WEIGHT: 117 LBS | DIASTOLIC BLOOD PRESSURE: 64 MMHG | BODY MASS INDEX: 22.09 KG/M2 | SYSTOLIC BLOOD PRESSURE: 110 MMHG | HEIGHT: 61 IN

## 2021-08-12 DIAGNOSIS — R35.0 FREQUENCY OF MICTURITION: ICD-10-CM

## 2021-08-12 DIAGNOSIS — N89.8 OTHER SPECIFIED NONINFLAMMATORY DISORDERS OF VAGINA: ICD-10-CM

## 2021-08-12 LAB
BILIRUB UR QL STRIP: NORMAL
GLUCOSE UR-MCNC: NORMAL
HCG UR QL: 0.2 EU/DL
HCG UR QL: NEGATIVE
HGB UR QL STRIP.AUTO: ABNORMAL
KETONES UR-MCNC: NORMAL
LEUKOCYTE ESTERASE UR QL STRIP: NORMAL
NITRITE UR QL STRIP: NORMAL
PH UR STRIP: 5.5
PROT UR STRIP-MCNC: NORMAL
QUALITY CONTROL: YES
SP GR UR STRIP: 1

## 2021-08-12 PROCEDURE — 99214 OFFICE O/P EST MOD 30 MIN: CPT

## 2021-08-12 PROCEDURE — 81003 URINALYSIS AUTO W/O SCOPE: CPT | Mod: QW

## 2021-08-12 PROCEDURE — 81025 URINE PREGNANCY TEST: CPT

## 2021-08-12 RX ORDER — FLUCONAZOLE 150 MG/1
150 TABLET ORAL
Qty: 2 | Refills: 0 | Status: ACTIVE | COMMUNITY
Start: 2021-08-12 | End: 1900-01-01

## 2021-08-13 LAB
APPEARANCE: CLEAR
BACTERIA: NEGATIVE
BILIRUBIN URINE: NEGATIVE
BLOOD URINE: NEGATIVE
C TRACH RRNA SPEC QL NAA+PROBE: NOT DETECTED
CANDIDA VAG CYTO: NOT DETECTED
COLOR: COLORLESS
G VAGINALIS+PREV SP MTYP VAG QL MICRO: NOT DETECTED
GLUCOSE QUALITATIVE U: NEGATIVE
HYALINE CASTS: 0 /LPF
KETONES URINE: NEGATIVE
LEUKOCYTE ESTERASE URINE: NEGATIVE
MICROSCOPIC-UA: NORMAL
N GONORRHOEA RRNA SPEC QL NAA+PROBE: NOT DETECTED
NITRITE URINE: NEGATIVE
PH URINE: 6.5
PROTEIN URINE: NEGATIVE
RED BLOOD CELLS URINE: 1 /HPF
SOURCE AMPLIFICATION: NORMAL
SPECIFIC GRAVITY URINE: 1
SQUAMOUS EPITHELIAL CELLS: 0 /HPF
T VAGINALIS VAG QL WET PREP: NOT DETECTED
UROBILINOGEN URINE: NORMAL
WHITE BLOOD CELLS URINE: 0 /HPF

## 2021-08-16 ENCOUNTER — APPOINTMENT (OUTPATIENT)
Dept: FAMILY MEDICINE | Facility: CLINIC | Age: 31
End: 2021-08-16

## 2021-08-16 LAB — BACTERIA UR CULT: NORMAL

## 2021-08-23 ENCOUNTER — TRANSCRIPTION ENCOUNTER (OUTPATIENT)
Age: 31
End: 2021-08-23

## 2021-09-14 ENCOUNTER — APPOINTMENT (OUTPATIENT)
Dept: OBGYN | Facility: CLINIC | Age: 31
End: 2021-09-14

## 2021-10-13 NOTE — REVIEW OF SYSTEMS
[Patient Intake Form Reviewed] : Patient intake form was reviewed [Frequency] : frequency [Breast Pain] : breast pain [Dysuria] : no dysuria [FreeTextEntry8] : Vaginal discharge and itching

## 2021-10-13 NOTE — PHYSICAL EXAM
[Appropriately responsive] : appropriately responsive [Alert] : alert [No Acute Distress] : no acute distress [Oriented x3] : oriented x3 [Labia Majora] : normal [Labia Minora] : normal [Discharge] : a  ~M vaginal discharge was present [Heavy] : heavy [White] : white [Thick] : thick [Normal] : normal [Uterine Adnexae] : normal [No Bleeding] : There was no active vaginal bleeding [Soft] : soft [Non-tender] : non-tender [Non-distended] : non-distended [No Mass] : no mass

## 2021-10-13 NOTE — DISCUSSION/SUMMARY
[FreeTextEntry1] : -Vaginal discharge-\par 1) GC/Chl and Affirm collected to r/o vaginitis.\par 2) Likely candidiasis, however vaginal discharge may appear thicker due to recent Monistat OTC treatment. Patient desires to be treated prior to receiving results since she is symptomatic. Recommended discontinuing Monistat.  Rx Fluconazole 150 mg PO was prescribed. \par 3) We discussed vulvar hygiene. Recommended using unscented and undyed soaps, detergents, and lotions. Also recommended wearing cotton underwear and loose fitting clothing.\par \par -Urinary frequency-  Benign abdominal and back exam. No CVA tenderness.\par 1) U/A  POC significant for trace hematuria. \par 2) U/A and urine culture were ordered to r/o UTI.\par \par -Patient reports having a left ovarian cyst on her pelvic sono at [place] earlier this week- Pelvic sono was ordered by Urgent Care provider and results have not been confirmed or discussed with patient.\par 1) Recommended signing ROR and getting pelvic sono records. Recommended following up with [facility] provider to discuss her sono results.\par 2) Prior pelvic sono in 5/2021: WNL. Normal appearing bilateral ovaries.\par 3) Ovarian torsion and call parameters were discussed. \par 4) Would recommend repeat pelvic sono in 6 weeks if ovarian cyst is confirmed on pelvic sono done earlier this week\par \par -Right breast pain x 1 day-\par 1) She declines breast exam today. We discussed the importance of a breast exam if her breast pain persists.\par 2) We discussed wearing a well fitted bra, massage, NSAIDs, decreased caffeine intake, and compresses PRN. \par  \par -Follow up in 6 weeks for annual GYN exam and pelvic sono.\par \par All questions and concerns addressed.

## 2021-10-13 NOTE — HISTORY OF PRESENT ILLNESS
[Gonorrhea test offered] : Gonorrhea test offered [Chlamydia test offered] : Chlamydia test offered [Y] : Positive pregnancy history [Menarche Age: ____] : age at menarche was [unfilled] [Currently Active] : currently active [PapSmeardate] : 6/05/20 [TextBox_31] : NEG [GonorrheaDate] : 2/17/21 [TextBox_63] : NEG [ChlamydiaDate] : 2/17/21 [TextBox_68] : NEG [LMPDate] : 8/01/21 [PGxTotal] : 1 [[facility]xFul[facility]] : 1 [[facility] Centeriving] : 1 [FreeTextEntry1] : 8/01/21

## 2021-11-01 ENCOUNTER — RX RENEWAL (OUTPATIENT)
Age: 31
End: 2021-11-01

## 2022-05-16 ENCOUNTER — APPOINTMENT (OUTPATIENT)
Dept: OBGYN | Facility: CLINIC | Age: 32
End: 2022-05-16

## 2022-07-01 ENCOUNTER — APPOINTMENT (OUTPATIENT)
Dept: OBGYN | Facility: CLINIC | Age: 32
End: 2022-07-01

## 2022-07-01 ENCOUNTER — NON-APPOINTMENT (OUTPATIENT)
Age: 32
End: 2022-07-01

## 2022-07-01 VITALS
BODY MASS INDEX: 21.71 KG/M2 | WEIGHT: 115 LBS | DIASTOLIC BLOOD PRESSURE: 60 MMHG | SYSTOLIC BLOOD PRESSURE: 90 MMHG | HEIGHT: 61 IN

## 2022-07-01 DIAGNOSIS — Z01.411 ENCOUNTER FOR GYNECOLOGICAL EXAMINATION (GENERAL) (ROUTINE) WITH ABNORMAL FINDINGS: ICD-10-CM

## 2022-07-01 PROCEDURE — 99395 PREV VISIT EST AGE 18-39: CPT

## 2022-07-01 PROCEDURE — 81025 URINE PREGNANCY TEST: CPT

## 2022-07-01 RX ORDER — NORETHINDRONE ACETATE AND ETHINYL ESTRADIOL .5; .0025 MG/1; MG/1
TABLET ORAL
Refills: 0 | Status: DISCONTINUED | COMMUNITY
End: 2022-07-01

## 2022-07-01 NOTE — PHYSICAL EXAM
[Appropriately responsive] : appropriately responsive [Alert] : alert [No Acute Distress] : no acute distress [Soft] : soft [Non-tender] : non-tender [Non-distended] : non-distended [No Lesions] : no lesions [No Mass] : no mass [Oriented x3] : oriented x3 [Examination Of The Breasts] : a normal appearance [] : implants [No Masses] : no breast masses were palpable [Normal] : normal [Normal Position] : in a normal position [Uterine Adnexae] : normal [Tenderness] : nontender [Enlarged ___ wks] : not enlarged

## 2022-07-01 NOTE — HISTORY OF PRESENT ILLNESS
[Regular Cycle Intervals] : periods have been regular [Yes] : Patient has concerns regarding sex [Currently Active] : currently active [Men] : men [No] : No [TextBox_4] : Franca is here for an annual exam. She reports cramping during and after for about 2 years, lasts about 20 mins. She denies pain at any other time during the month. \par \par she has used oral contraceptives for at least the last 10years except for when she was pregnant.  she stopped estrogen use about 2 years ago due to migraines. she uses progesterone only pills now.\par \par  [FreeTextEntry1] : pain during sex

## 2022-07-01 NOTE — PLAN
[FreeTextEntry1] : Discussed differentials for pelvic pain.  Recommend to repeat pelvic US and return for MD consult same day, briefly discussed endometriosis- will discuss further evaluation with MD. Advised pt US cannot definitively r/o endometriosis.\par \par pap obtained- f/u results\par

## 2022-07-02 LAB
HCG UR QL: NEGATIVE
QUALITY CONTROL: YES

## 2022-07-06 LAB — HPV HIGH+LOW RISK DNA PNL CVX: NOT DETECTED

## 2022-07-08 LAB — CYTOLOGY CVX/VAG DOC THIN PREP: NORMAL

## 2022-07-29 ENCOUNTER — APPOINTMENT (OUTPATIENT)
Dept: OBGYN | Facility: CLINIC | Age: 32
End: 2022-07-29

## 2022-07-29 ENCOUNTER — ASOB RESULT (OUTPATIENT)
Age: 32
End: 2022-07-29

## 2022-07-29 ENCOUNTER — APPOINTMENT (OUTPATIENT)
Dept: ANTEPARTUM | Facility: CLINIC | Age: 32
End: 2022-07-29

## 2022-07-29 VITALS
BODY MASS INDEX: 21.71 KG/M2 | DIASTOLIC BLOOD PRESSURE: 60 MMHG | WEIGHT: 115 LBS | HEIGHT: 61 IN | SYSTOLIC BLOOD PRESSURE: 98 MMHG

## 2022-07-29 PROCEDURE — 99213 OFFICE O/P EST LOW 20 MIN: CPT

## 2022-07-29 PROCEDURE — 76830 TRANSVAGINAL US NON-OB: CPT

## 2022-07-31 NOTE — COUNSELING
[Contraception/ Emergency Contraception/ Safe Sexual Practices] : contraception, emergency contraception, safe sexual practices [Medication Management] : medication management [Pre/Post Op Instructions] : pre/post op instructions

## 2022-07-31 NOTE — PHYSICAL EXAM
[Chaperone Present] : A chaperone was present in the examining room during all aspects of the physical examination [FreeTextEntry1] : tahmina [Appropriately responsive] : appropriately responsive [Alert] : alert [No Acute Distress] : no acute distress [Oriented x3] : oriented x3

## 2022-07-31 NOTE — DISCUSSION/SUMMARY
[FreeTextEntry1] : We discussed that pain during intercourse can be multifactorial. I advised pelvic physical therapy which can improve symptoms over several months. \par We discussed that it is possible endometriosis is causing some of her symptoms. The fact that these symptoms became severe when she stopped CHC indicates that endometriosis may be present. I advised that she consider an IUD which can be effective for pelvic pain associated with endometriosis and is safe for someone with migraine+aura. \par We discussed that endometriosis is definitively diagnosed by surgery+histology. She is not sure whether or not she wants to pursue surgery as this time, but is considering as these symptoms can be very severe. She will try pelvic PT and will RTO in 3 months to re-evaluate symptoms and she if she wishes to pursue any of the above recommendations.

## 2022-07-31 NOTE — HISTORY OF PRESENT ILLNESS
[Yes] : Patient has concerns regarding sex [Currently Active] : currently active [FreeTextEntry1] : pain as above

## 2022-10-24 ENCOUNTER — APPOINTMENT (OUTPATIENT)
Dept: OBGYN | Facility: CLINIC | Age: 32
End: 2022-10-24

## 2023-03-20 ENCOUNTER — RX RENEWAL (OUTPATIENT)
Age: 33
End: 2023-03-20

## 2023-03-28 ENCOUNTER — NON-APPOINTMENT (OUTPATIENT)
Age: 33
End: 2023-03-28

## 2023-03-28 ENCOUNTER — APPOINTMENT (OUTPATIENT)
Dept: OBGYN | Facility: CLINIC | Age: 33
End: 2023-03-28
Payer: MEDICAID

## 2023-03-28 VITALS
DIASTOLIC BLOOD PRESSURE: 62 MMHG | WEIGHT: 120 LBS | SYSTOLIC BLOOD PRESSURE: 105 MMHG | BODY MASS INDEX: 22.66 KG/M2 | HEIGHT: 61 IN

## 2023-03-28 PROCEDURE — 99213 OFFICE O/P EST LOW 20 MIN: CPT

## 2023-03-30 NOTE — HISTORY OF PRESENT ILLNESS
[FreeTextEntry1] : Franca presents for follow up of pelvic pain. The worst symptoms occurring during intercourse. She reports that she has intense cramping that starts a few minutes after starting having sex. She never experienced this with previous partners. She did not try pelvic PT because it was too much of a time commitment. We discussed surgery at our last visit and she is interested in possibly going to the OR for her symptoms. \par She feels nauseas all the time. She has chronic constipation. \par She is using POPs as she cannot take estrogen. She gets pelvic cramping during menses which can be severe. \par She also gets mid cycle pain even on this pill. All these symptoms were better on CHC. \par Sonogram last year showed normal anatomy aside from small bilateral cysts.

## 2023-03-30 NOTE — PHYSICAL EXAM
[FreeTextEntry1] : m [Appropriately responsive] : appropriately responsive [Alert] : alert [No Acute Distress] : no acute distress [Oriented x3] : oriented x3

## 2023-03-30 NOTE — DISCUSSION/SUMMARY
[FreeTextEntry1] : We discussed management options for pelvic pain and dyspareunia. \par She declines to try pelvic physical therapy. \par We discussed medical management which might include Mirena IUD, Depo shot, or orilissa. She is hesitant to initiate any of these methods at this time but would like to consider orilissa.\par We reviewed that laparoscopy for pelvic pain is mostly a diagnostic procedure. It can help us identify whether or not endometriosis is present and if there are any structural etiologies. Long term management of dyspareunia is still accomplished by pelvic PT and medication management. \par She is considering her options. She should have a repeat sonogram. She will RTO in 2-4 weeks for sonogram, and after consideration of these options we will discuss how she wants to proceed.

## 2023-04-12 ENCOUNTER — APPOINTMENT (OUTPATIENT)
Dept: COLORECTAL SURGERY | Facility: CLINIC | Age: 33
End: 2023-04-12
Payer: COMMERCIAL

## 2023-04-12 PROCEDURE — 99443: CPT

## 2023-04-12 NOTE — REASON FOR VISIT
[Home] : at home, [unfilled] , at the time of the visit. [Medical Office: ([facility])___] : at the medical office located in  [Verbal consent obtained from patient] : the patient, [unfilled] [Consultation] : a consultation visit

## 2023-04-12 NOTE — CONSULT LETTER
[Dear  ___] : Dear  [unfilled], [Consult Letter:] : I had the pleasure of evaluating your patient, [unfilled]. [Please see my note below.] : Please see my note below. [Consult Closing:] : Thank you very much for allowing me to participate in the care of this patient.  If you have any questions, please do not hesitate to contact me. [Sincerely,] : Sincerely, [FreeTextEntry3] : Rufus Mir MD\par

## 2023-04-12 NOTE — HISTORY OF PRESENT ILLNESS
[FreeTextEntry1] : 32-year-old female who presents for a telephonic visit to discuss a colonoscopy.  She reports few month history of lower abdominal pain particularly during intercourse.  She also has a history of constipation and bloating that does not seem particularly related to diet.  She denies any rectal bleeding.  Concern is for endometriosis as a source of her symptoms and imaging so far has been unyielding with the exception of some cysts.  As part of the work-up to exclude other pathology, colonoscopy is requested.

## 2023-04-18 ENCOUNTER — APPOINTMENT (OUTPATIENT)
Dept: ANTEPARTUM | Facility: CLINIC | Age: 33
End: 2023-04-18
Payer: COMMERCIAL

## 2023-04-18 ENCOUNTER — APPOINTMENT (OUTPATIENT)
Dept: OBGYN | Facility: CLINIC | Age: 33
End: 2023-04-18
Payer: COMMERCIAL

## 2023-04-18 ENCOUNTER — ASOB RESULT (OUTPATIENT)
Age: 33
End: 2023-04-18

## 2023-04-18 VITALS
BODY MASS INDEX: 22.66 KG/M2 | WEIGHT: 120 LBS | SYSTOLIC BLOOD PRESSURE: 118 MMHG | HEIGHT: 61 IN | DIASTOLIC BLOOD PRESSURE: 72 MMHG

## 2023-04-18 DIAGNOSIS — N94.10 UNSPECIFIED DYSPAREUNIA: ICD-10-CM

## 2023-04-18 DIAGNOSIS — Z30.2 ENCOUNTER FOR STERILIZATION: ICD-10-CM

## 2023-04-18 PROCEDURE — 99213 OFFICE O/P EST LOW 20 MIN: CPT

## 2023-04-18 PROCEDURE — 76830 TRANSVAGINAL US NON-OB: CPT

## 2023-04-19 NOTE — HISTORY OF PRESENT ILLNESS
[FreeTextEntry1] : Ludmila presents with her partner to discuss pain during intercourse. These symptoms have been present for several years and have been worsening. The pain only occurs during vaginal intercourse. Sonogram today does not show any abnormalities. In the past, we have discussed management options including pelvic PT, medical management using orilissa, laparoscopy to rule out endo, and ohnut device. She has not initiated any of these interventions but they wish to discuss options further. \par She has been taking POPs, would like to come off birth control and is considering sterilization. They both expressed that they do not desire future fertility.

## 2023-04-19 NOTE — DISCUSSION/SUMMARY
[FreeTextEntry1] : Management of dyspareunia was reviewed with the patient: medical management, pelvic PT, ohnut, and laparoscopy to rule out endometriosis. At this time they are considering trial of pelvic PT and I recommended women's therapy center. \par \par She is considering lap BS for sterilization as she would like to discontinue POPs. We discussed the procedure and its irreversible nature. Expected recovery time was reviewed. A diagram was used to explain relevant anatomy. She is considering whether or not she wants to pursue this surgery. \par RTO as needed or for annual exam.

## 2023-06-05 ENCOUNTER — RX RENEWAL (OUTPATIENT)
Age: 33
End: 2023-06-05

## 2023-06-13 ENCOUNTER — APPOINTMENT (OUTPATIENT)
Dept: COLORECTAL SURGERY | Facility: AMBULATORY MEDICAL SERVICES | Age: 33
End: 2023-06-13

## 2023-06-13 DIAGNOSIS — R10.2 PELVIC AND PERINEAL PAIN: ICD-10-CM

## 2023-06-15 PROBLEM — R10.2 PELVIC PAIN IN FEMALE: Status: ACTIVE | Noted: 2022-07-29

## 2023-06-22 ENCOUNTER — APPOINTMENT (OUTPATIENT)
Dept: SURGERY | Facility: CLINIC | Age: 33
End: 2023-06-22

## 2023-08-08 ENCOUNTER — APPOINTMENT (OUTPATIENT)
Dept: OBGYN | Facility: CLINIC | Age: 33
End: 2023-08-08

## 2023-08-15 ENCOUNTER — RX RENEWAL (OUTPATIENT)
Age: 33
End: 2023-08-15

## 2023-09-12 ENCOUNTER — APPOINTMENT (OUTPATIENT)
Dept: OBGYN | Facility: CLINIC | Age: 33
End: 2023-09-12

## 2023-10-02 RX ORDER — NORETHINDRONE 0.35 MG/1
0.35 TABLET ORAL DAILY
Qty: 1 | Refills: 0 | Status: ACTIVE | COMMUNITY
Start: 2020-03-12

## 2023-10-16 ENCOUNTER — APPOINTMENT (OUTPATIENT)
Dept: OBGYN | Facility: CLINIC | Age: 33
End: 2023-10-16

## 2023-10-26 ENCOUNTER — RX RENEWAL (OUTPATIENT)
Age: 33
End: 2023-10-26

## 2023-10-26 RX ORDER — NORETHINDRONE 0.35 MG/1
0.35 TABLET ORAL DAILY
Qty: 28 | Refills: 0 | Status: ACTIVE | COMMUNITY
Start: 2023-10-26 | End: 1900-01-01

## 2023-11-02 ENCOUNTER — NON-APPOINTMENT (OUTPATIENT)
Age: 33
End: 2023-11-02

## 2023-11-02 ENCOUNTER — APPOINTMENT (OUTPATIENT)
Dept: OBGYN | Facility: CLINIC | Age: 33
End: 2023-11-02
Payer: COMMERCIAL

## 2023-11-02 VITALS
SYSTOLIC BLOOD PRESSURE: 114 MMHG | BODY MASS INDEX: 22.66 KG/M2 | WEIGHT: 120 LBS | HEIGHT: 61 IN | TEMPERATURE: 98 F | DIASTOLIC BLOOD PRESSURE: 72 MMHG

## 2023-11-02 DIAGNOSIS — Z01.419 ENCOUNTER FOR GYNECOLOGICAL EXAMINATION (GENERAL) (ROUTINE) W/OUT ABNORMAL FINDINGS: ICD-10-CM

## 2023-11-02 DIAGNOSIS — Z30.09 ENCOUNTER FOR OTHER GENERAL COUNSELING AND ADVICE ON CONTRACEPTION: ICD-10-CM

## 2023-11-02 DIAGNOSIS — Z30.41 ENCOUNTER FOR SURVEILLANCE OF CONTRACEPTIVE PILLS: ICD-10-CM

## 2023-11-02 DIAGNOSIS — Z12.4 ENCOUNTER FOR SCREENING FOR MALIGNANT NEOPLASM OF CERVIX: ICD-10-CM

## 2023-11-02 DIAGNOSIS — Z11.51 ENCOUNTER FOR SCREENING FOR HUMAN PAPILLOMAVIRUS (HPV): ICD-10-CM

## 2023-11-02 PROCEDURE — 99395 PREV VISIT EST AGE 18-39: CPT

## 2023-11-02 RX ORDER — NORETHINDRONE 0.35 MG/1
0.35 TABLET ORAL DAILY
Qty: 3 | Refills: 3 | Status: ACTIVE | COMMUNITY
Start: 2020-06-05 | End: 1900-01-01

## 2023-11-03 LAB — HPV HIGH+LOW RISK DNA PNL CVX: NOT DETECTED

## 2023-11-06 PROBLEM — Z30.41 ENCOUNTER FOR SURVEILLANCE OF CONTRACEPTIVE PILLS: Status: ACTIVE | Noted: 2023-11-06

## 2023-11-06 PROBLEM — Z30.09 CONTRACEPTIVE EDUCATION: Status: ACTIVE | Noted: 2023-11-06

## 2023-11-06 LAB — CYTOLOGY CVX/VAG DOC THIN PREP: NORMAL

## 2024-07-22 ENCOUNTER — NON-APPOINTMENT (OUTPATIENT)
Age: 34
End: 2024-07-22

## 2024-10-21 ENCOUNTER — RX RENEWAL (OUTPATIENT)
Age: 34
End: 2024-10-21

## 2025-01-16 ENCOUNTER — NON-APPOINTMENT (OUTPATIENT)
Age: 35
End: 2025-01-16

## 2025-03-24 ENCOUNTER — NON-APPOINTMENT (OUTPATIENT)
Age: 35
End: 2025-03-24

## 2025-03-25 ENCOUNTER — APPOINTMENT (OUTPATIENT)
Dept: OBGYN | Facility: CLINIC | Age: 35
End: 2025-03-25

## 2025-03-25 VITALS
SYSTOLIC BLOOD PRESSURE: 98 MMHG | HEIGHT: 61 IN | BODY MASS INDEX: 23.32 KG/M2 | DIASTOLIC BLOOD PRESSURE: 58 MMHG | WEIGHT: 123.5 LBS

## 2025-03-25 DIAGNOSIS — Z01.419 ENCOUNTER FOR GYNECOLOGICAL EXAMINATION (GENERAL) (ROUTINE) W/OUT ABNORMAL FINDINGS: ICD-10-CM

## 2025-03-25 DIAGNOSIS — Z87.42 PERSONAL HISTORY OF OTHER DISEASES OF THE FEMALE GENITAL TRACT: ICD-10-CM

## 2025-03-25 DIAGNOSIS — R10.2 PELVIC AND PERINEAL PAIN: ICD-10-CM

## 2025-03-25 DIAGNOSIS — Z30.41 ENCOUNTER FOR SURVEILLANCE OF CONTRACEPTIVE PILLS: ICD-10-CM

## 2025-03-25 DIAGNOSIS — Z12.4 ENCOUNTER FOR SCREENING FOR MALIGNANT NEOPLASM OF CERVIX: ICD-10-CM

## 2025-03-25 DIAGNOSIS — Z11.51 ENCOUNTER FOR SCREENING FOR HUMAN PAPILLOMAVIRUS (HPV): ICD-10-CM

## 2025-03-25 PROCEDURE — 99395 PREV VISIT EST AGE 18-39: CPT

## 2025-03-27 LAB — HPV HIGH+LOW RISK DNA PNL CVX: NOT DETECTED

## 2025-03-30 LAB — CYTOLOGY CVX/VAG DOC THIN PREP: NORMAL

## 2025-06-10 ENCOUNTER — NON-APPOINTMENT (OUTPATIENT)
Age: 35
End: 2025-06-10